# Patient Record
(demographics unavailable — no encounter records)

---

## 2025-02-18 NOTE — HISTORY OF PRESENT ILLNESS
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 He states that he has been under the pain management receiving injections He had intra-articular hip injection once And he recently had a cortisone injection 2 weeks ago in the right knee but injection did not help him  Complains of severe pain in the right knee he is walking with a single-point cane  He tried not to take any medication with his complex medical history  Patient had a post COVID infection history of PE and interstitial lung disease he had acute renal disease that is now resolved He is under the care of pulmonology and cardiology   [Pain Location] : pain [Worsening] : worsening [___ yrs] : [unfilled] year(s) ago [8] : a current pain level of 8/10 [Walking] : worsened by walking [Knee Flexion] : worsened with knee flexion [Rest] : relieved by rest

## 2025-02-18 NOTE — PHYSICAL EXAM
[Antalgic] : antalgic [Cane] : ambulates with cane [de-identified] : GENERAL APPEARANCE: Well nourished and hydrated, pleasant, alert, and oriented x 3. Appears their stated age.  HEENT: Normocephalic, extraocular eye motion intact. Nasal septum midline. Oral cavity clear. External auditory canal clear.  RESPIRATORY: Breath sounds clear and audible in all lobes. No wheezing, No accessory muscle use. CARDIOVASCULAR: No apparent abnormalities. No lower leg edema. No varicosities. Pedal pulses are palpable. NEUROLOGIC: Sensation is normal, no muscle weakness in the upper or lower extremities. DERMATOLOGIC: No apparent skin lesions, moist, warm, no rash. SPINE: Cervical spine appears normal and moves freely; thoracic spine appears normal and moves freely; lumbosacral spine appears normal and moves freely, normal, nontender. MUSCULOSKELETAL: Hands, wrists, and elbows are normal and move freely, shoulders are normal and move freely.  Musculoskeletal 5/5 motor strength in bilateral lower extremities. Sensory: Intact in bilateral lower extremities. DTRs: Biceps, brachioradialis, triceps, patellar, ankle and plantar 2+ and symmetric bilaterally. Pulses: dorsalis pedis, posterior tibial, femoral, popliteal, and radial 2+ and symmetric bilaterally.  Constitutional: Alert and in no acute distress, but well-appearing.   [de-identified] : Right hip examination shows straight leg raise without groin pain flexion of 120 degree external rotation 40 degree with hip pain internal rotation -5 degree with hip pain  Right knee examination shows varus alignment no significant joint effusion range of motion is 0 to 130 degree [de-identified] : 4 view of the right knee x-ray shows severe medial and patella compartment bone-on-bone osteoarthritis with significant bone spurs  1 view pelvic x-ray shows mild right hip osteoarthritis with significant heterotopic ossification which is a stable

## 2025-02-18 NOTE — DISCUSSION/SUMMARY
[de-identified] : Medication risks reviewed. Surgical risks reviewed. 54 y/o M with bone on bone medial compartmental osteoarthritis of the bilateral knees.  He is more symptomatic in the right knee .  He has failed to respond to conservative management with pain management at this time . we discussed the nature of the condition and the treatment options. 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 We explained again that the HO most likely formed due to him being very sedentary while in the hospital recovering from COVID.  His condition of the hip is stable . We did discuss an excision of the HO, but patient knows the outcome is variable. If he were to pursue surgical intervention, it would be with our traumatologist.  Patient was seen by Dr. Olivares but he does not want to pursue surgical treatment at this moment  he had good relief with the US guided intraarticular cortisone injections in both hips in March in 2022 He is also experiencing chronic numbness in both legs radiating down to his feet . It is difficult to determine whether or not the numbness is related to the HO formation. We discussed a possible EMG. Today we focused on his right knee care.  He has history of PE and completed Eliquis for 6-month . He does require clearance from pulmonology, cardiology, and primary to proceed with right total knee arthroplasty.  Inform patient that no injection should be given 3 months prior to his surgical date.  I provided a prescription for diclofenac to be used for severe pain as needed .  He will contact our surgical coordinator to schedule his right total knee arthroplasty   The patient is a 53 year individual with end stage arthritis of their right knee joint. Based upon the patient's continued symptoms and failure to respond to conservative treatment I have recommended a staged right total knee arthroplasty for this patient. A long discussion took place with the patient describing what a total joint replacement is and what the procedure would entail. A total knee arthroplasty model, similar to the implant that was used during the operation, was utilized to demonstrate and to discuss the various bearing surfaces of the implants. The hospitalization and post-operative care and rehabilitation were also discussed. The use of perioperative antibiotics and DVT prophylaxis were discussed. The risk, benefits and alternatives to a surgical intervention were discussed at length with the patient. The patient was also advised of risks related to the medical comorbidities, elevated body mass index (BMI), and smoking where applicable. We discussed how to reduce modifiable risk factors and encouraged smoking cessation were applicable.. A lengthy discussion took place to review the most common complications including but not limited to: deep vein thrombosis, pulmonary embolus, heart attack, stroke, infection, wound breakdown, numbness, damage to nerves, tendon, muscles, arteries or other blood vessels, death and other possible complications from anesthesia. The patient was told that we will take steps to minimize these risks by using sterile technique, antibiotics and DVT prophylaxis when appropriate and follow the patient postoperatively in the office setting to monitor progress. The possibility of recurrent pain, no improvement in pain and actual worsening of pain were also discussed with the patient.  The discharge plan of care focused on the patient going home following surgery. The patient was encouraged to make the necessary arrangements to have someone stay with them when they are discharged home. Following discharge, a home care nurse was to the patient. The home care nurse would open the patient's home care case and request home physical therapy services. Home physical therapy was to commence following discharge provided it was appropriate and covered by the health insurance benefit plan.  The benefits of surgery were discussed with the patient including the potential for improving his current clinical condition through operative intervention. Alternatives to surgical intervention including continued conservative management were also discussed in detail. All questions were answered to the satisfaction of the patient. The treatment plan of care, as well as a model of a total knee arthroplasty equivalent to the one that will be used for their total joint replacement, was shared with the patient. The patient agreed to the plan of care as well as the use of implants in their total joint replacement.

## 2025-04-09 NOTE — ASSESSMENT
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   - Recently dx with flu, was given Z-pack, then Doxycycline by PCP as well as steroids - Continue PRN oxygen - Advised to check oxygen when he feels SOB at the gym - Had a CT showing stable findings. Mild dilation of pulmonary artery 4.2cm, previously 3.9cm - This may be due to his HTN - Advised to cut down coffee (he drinks 4-5 cups of black coffee per day) as well as salt intake - Should see Dr. Peterson as f/u - Will repeat PFT at next visit - F/u in 4 mo time  The patient expressed understanding and agreement with the plan as outlined above and accepts responsibility to be compliant with any recommended testing, treatment, and follow-up visits.  All relevant questions and concerns were addressed.  30 minutes of time were spent on the encounter. Medical records were reviewed, including but not limited to hospital records, outpatient records, laboratory data, and diagnostic imaging studies. Greater than 50% of the face-to-face encounter time was spent on counseling and/or coordination of care.   Osvaldo Hooks MD, [facility] Pulmonary [facility] Critical [facility] Medicine [facility] at [place] [address] [place] [address] T: [phone] F: [phone]

## 2025-04-09 NOTE — RESULTS/DATA
[TextEntry] : AIMEE EXAM: [id number] - CT CHEST - ORDERED BY: GABO MARTINES   PROCEDURE DATE: 03/31/2025    INTERPRETATION: CLINICAL INFORMATION: EVAL  COMPARISON: Comparison made with previous examination(s) dated (DX) 12-Mar-2025,(DX) 16-Dec-2024,(CT) 05-Sep-2024,(CR) 13-Jun-2024,(NM) 13-Jun-2024,(CT) 16-Feb-2024,(CT) 03-Aug-2023,(CT) 20-Feb-2023,(CT) 19-Jan-2023,(DX) 19-Jan-2023,(DX) 16-Jan-2023,(CT) 16-Jan-2023,(CT) 01-Dec-2022,(CT) 09-Jun-2022,(CT) 17-Feb-2022,(CT) 05-Nov-2021,(CT) 22-Oct-2021,(CT) 04-Oct-2021,(CT) 13-Sep-2021,(CT) 07-Sep-2021,(CT) 11-Aug-2018.  CONTRAST/COMPLICATIONS: IV Contrast: NONE Oral Contrast: NONE .  PROCEDURE: CT chest without contrast  FINDINGS:  LUNGS AND LARGE AIRWAYS: Patent central airways. Again seen is a right apical bulla, unchanged from prior. There are areas of reticulation and fibrosis throughout both lungs with several regions of subpleural band formation. The appearance is unchanged from prior. There is a calcified pleural plaque in the anterior right hemithorax, which may be from prior pleurodesis or hemothorax. This is also unchanged from prior. PLEURA: No pleural effusion. VESSELS: Enlarged pulmonary artery. HEART: Heart size is normal. No pericardial effusion. MEDIASTINUM AND KAILASH: No lymphadenopathy. CHEST WALL AND LOWER NECK: Within normal limits. VISUALIZED UPPER ABDOMEN: Cholelithiasis. BONES: Within normal limits.  IMPRESSION:  1. Overall, stable exam. Unchanged regions of reticulation with architectural distortion and subpleural band formation throughout both lungs, consistent with fibrosis likely from prior diffuse alveolar damage.  2. Right apical bleb, unchanged from prior.  3. Enlarged pulmonary artery, which can be seen with pulmonary hypertension.  --- End of Report ---       VIRGEN KATHYA MD; Attending Radiologist This document has been electronically signed. Apr 4 2025 7:03PM  ~~~~~~~~~~~~~~~~~~~~~~~~~~~~~~~~~~~~~~~~~~~~~~~~~~~~~~~~~~~~~~~~~~~~~~~~

## 2025-04-09 NOTE — HISTORY OF PRESENT ILLNESS
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 Recently dx with flu, was given Z-pack, then Doxycycline by PCP as well as steroids. He wears O2 PRN. He is able to work-out with heavy weights, but does get winded with particularly heavy lifts. Had a CT showing stable findings. Mild dilation of pulmonary artery 4.2cm.

## 2025-05-29 NOTE — DISCUSSION/SUMMARY
[Patient] : the patient [Risks] : risks [Benefits] : benefits [Alternatives] : alternatives [With Me] : with me [___ Year(s)] : in [unfilled] year(s) [EKG obtained to assist in diagnosis and management of assessed problem(s)] : EKG obtained to assist in diagnosis and management of assessed problem(s) [FreeTextEntry1] : This is a   1)    Pre-operative cardiovascular risk evaluation and management : right knee replacemnt.  /  transthoracic echocardiogram .   if normal.  then proceed for procedure. jhas normal stress test  in the last 1 year.  DVT ppx consider full funez eliquis  as patient had prior PE.  nad willstop aspirin when he intiatie noac post knee replacement.  2) dyspnea on exertion :  improved. puloanry etiology.   2) hypertension : intiaite norvasc 2.5 mg daily.  3) pulmonary embolism : with Rv dysfunction:  mild  exercise indueced pulm HTn but  also systemic hypertension . repeat resting transthoracic echocardiogram . a nd also initaite norvasc 2,.5 mg encourage for compliance.  had ordered it in sept 2024. in the past. somehow he was not taking it.  4) recurrent infection: recurrent pneumonia.     Will order and review ECG for the above mentioned diagnosis/condition/symptoms

## 2025-05-29 NOTE — PHYSICAL EXAM
[General Appearance - Well Developed] : well developed [Normal Appearance] : normal appearance [Well Groomed] : well groomed [General Appearance - Well Nourished] : well nourished [No Deformities] : no deformities [General Appearance - In No Acute Distress] : no acute distress [Normal Conjunctiva] : the conjunctiva exhibited no abnormalities [Eyelids - No Xanthelasma] : the eyelids demonstrated no xanthelasmas [Normal Oral Mucosa] : normal oral mucosa [No Oral Pallor] : no oral pallor [No Oral Cyanosis] : no oral cyanosis [Normal Jugular Venous A Waves Present] : normal jugular venous A waves present [Normal Jugular Venous V Waves Present] : normal jugular venous V waves present [No Jugular Venous Azar A Waves] : no jugular venous azar A waves [Respiration, Rhythm And Depth] : normal respiratory rhythm and effort [Exaggerated Use Of Accessory Muscles For Inspiration] : no accessory muscle use [Auscultation Breath Sounds / Voice Sounds] : lungs were clear to auscultation bilaterally [Heart Rate And Rhythm] : heart rate and rhythm were normal [Heart Sounds] : normal S1 and S2 [Murmurs] : no murmurs present [Abdomen Soft] : soft [Abdomen Tenderness] : non-tender [Abdomen Mass (___ Cm)] : no abdominal mass palpated [Abnormal Walk] : normal gait [Gait - Sufficient For Exercise Testing] : the gait was sufficient for exercise testing [Cyanosis, Localized] : no localized cyanosis [Nail Clubbing] : no clubbing of the fingernails [Petechial Hemorrhages (___cm)] : no petechial hemorrhages [Skin Color & Pigmentation] : normal skin color and pigmentation [] : no rash [No Venous Stasis] : no venous stasis [Skin Lesions] : no skin lesions [No Skin Ulcers] : no skin ulcer [No Xanthoma] : no  xanthoma was observed [Oriented To Time, Place, And Person] : oriented to person, place, and time [Affect] : the affect was normal [Mood] : the mood was normal [No Anxiety] : not feeling anxious

## 2025-05-29 NOTE — REASON FOR VISIT
[Symptom and Test Evaluation] : symptom and test evaluation [FreeTextEntry1] : Pre-operative cardiovascular risk evaluation and management

## 2025-05-29 NOTE — HISTORY OF PRESENT ILLNESS
[FreeTextEntry1] : Pre-operative cardiovascular risk evaluation and management  for knee arthrscopy and irregular ECG    HPI for today :  5 29 2025:  feels good. he does have knee pain abilaterlal.  he is going for a knee surgery in oct 2025, right knee replacement.  right for surer no chest pain . no dyspnea on exertion   old NOTE: :  he is still feelign dyspnea on exertion . when he lays down he feels dyspnea on exertion .  his lung disease has improved .  he had covid   he does have reactive airway disaese now. when he walks fast he can feel dyspnea on exertion   ol n note:    feels good. some salty food . leg edema intermittent when he eats salty food.   and he is feeling fatigue and tired.     50 year old male with history of lumbar fusion, severe COVID infection 9/2021 which resulted in PNA, intubation and subsequent tracheostomy, PE with RV dysfunction, and heterotropic ossification of hips, who presents to the office for follow up of RV dysfunction.    old note:  he was tested positive on sept 2n .  and on 7th he was positive.  he had coivd pneumonia.  his pneumonia got worse.  on sept 9. his oxygen was in 70's/ he got remdesivir.  and then 10 days o. he got intubated on sept 20.  he was on vent. he was on vent for 15 days.  he got tracheotomy he was on ./hd for 3 days. he had PE and bacterial pneumonia. he was on heparin. some blood .  oct 6, he got trachetomy.  he felt better.  he had some feeding tube  he went to [facility] in [facility].  in nov was sent to rehab. .   before he left rehab. they had notived some fluid   he was put on abx again.  he had low grade fever.  and now since 3 days he is off abx.      old note: This is a 49 yea rold male with obesity, here with irregular ECg and here for Pre-operative cardiovascular risk evaluation and management for right knee arthorscopy.  he takes anabolic steroids and testosterone.  father: MI: at age 70.

## 2025-05-29 NOTE — CARDIOLOGY SUMMARY
[___] : [unfilled] [de-identified] : 5 29 2025: Sinus Rhythm -First degree A-V block  Bernice = 240  Poor r wave progressions.  ABNORMAL 5 7 2024:   sinus rhyhtm  6 21 2022 :    Sinus  Rhythm  WITHIN NORMAL LIMITS   1 18 2022   Sinus  Rhythm  -Left axis -anterior fascicular block.   ABNORMAL   [de-identified] : jan 2024:  normal LVEf. normal RV . no significant valvular abnormality  [de-identified] : cardiac cta:  CAC  = 0. normal coronary arteries.  dialted pulm Artery.  [de-identified] : US arterial Dupelx leg: normal  ABRAHAN was [place]

## 2025-06-03 NOTE — HISTORY OF PRESENT ILLNESS
[de-identified] : This is a 53-year-old male with severe medial and patellofemoral compartment osteoarthritis of the right knee he is scheduled to have right knee replacement in October Today he complains of increased pain in the left knee, not as bad as the right side   but he inquires about having both knee replaced  Patient is under pain management receiving injections gives him knee short-term relief he is walking with single-point cane  Does have a medical history of PE after COVID infection he is under the care of of pulmonary and cardiologist Cardiology is recommending using Eliquis after surgery is on 325 mg aspirin daily Patient has a prescription of  diclofenac that he tries not to take it  He also has has mild to moderate intermittent hip pain  [Pain Location] : pain [Worsening] : worsening [6] : a current pain level of 6/10 [Walking] : worsened by walking [Knee Flexion] : worsened with knee flexion [Rest] : relieved by rest

## 2025-06-03 NOTE — PHYSICAL EXAM
[Antalgic] : antalgic [Cane] : ambulates with cane [de-identified] : GENERAL APPEARANCE: Well nourished and hydrated, pleasant, alert, and oriented x 3. Appears their stated age.  HEENT: Normocephalic, extraocular eye motion intact. Nasal septum midline. Oral cavity clear. External auditory canal clear.  RESPIRATORY: Breath sounds clear and audible in all lobes. No wheezing, No accessory muscle use. CARDIOVASCULAR: No apparent abnormalities. No lower leg edema. No varicosities. Pedal pulses are palpable. NEUROLOGIC: Sensation is normal, no muscle weakness in the upper or lower extremities. DERMATOLOGIC: No apparent skin lesions, moist, warm, no rash. SPINE: Cervical spine appears normal and moves freely; thoracic spine appears normal and moves freely; lumbosacral spine appears normal and moves freely, normal, nontender. MUSCULOSKELETAL: Hands, wrists, and elbows are normal and move freely, shoulders are normal and move freely.  Musculoskeletal 5/5 motor strength in bilateral lower extremities. Sensory: Intact in bilateral lower extremities. DTRs: Biceps, brachioradialis, triceps, patellar, ankle and plantar 2+ and symmetric bilaterally. Pulses: dorsalis pedis, posterior tibial, femoral, popliteal, and radial 2+ and symmetric bilaterally.  Constitutional: Alert and in no acute distress, but well-appearing.   [de-identified] : Bilateral knee examination shows varus deformity right knee has a moderate joint effusion medial joint line tenderness range of motion is 5 to 120 degrees  [de-identified] : 4 view left knee x-ray demonstrates severe medial compartment osteoarthritis with significant bone spurs and osteophyte formation

## 2025-06-03 NOTE — DISCUSSION/SUMMARY
[de-identified] : Medication risks reviewed. Surgical risks reviewed. 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  He is more symptomatic in the right knee .  He has failed to respond to conservative management with pain management at this time . we discussed the nature of the condition and the treatment options. 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 We explained again that the HO most likely formed due to him being very sedentary while in the hospital recovering from COVID.  His condition of the hip is stable . We did discuss an excision of the HO, but patient knows the outcome is variable. If he were to pursue surgical intervention, it would be with our traumatologist.  Patient was seen by Dr. Olivares but he does not want to pursue surgical treatment at this moment  he had good relief with the US guided intraarticular cortisone injections in both hips in March in 2022 He is also experiencing chronic numbness in both legs radiating down to his feet . It is difficult to determine whether or not the numbness is related to the HO formation. We discussed a possible EMG.  But since that his pain has been chronic intermittent and stable we will focus on his knee pain Patient is scheduled to have a right total knee arthroplasty in October.  He had increased pain in the left knee and he inquired about bilateral knee replacement at the same time. Based on his history of PE  ( completed Eliquis for 6-month ) , interstitial lung disease moderate asthma.  His risk for blood clot is elevated. I recommend pursuing staged bilateral knee replacement.  Patient agrees.  he does require clearance from pulmonology, cardiology, and primary to proceed with right total knee arthroplasty.  Inform patient that no injection should be given 3 months prior to his surgical date.  Patient is planning to have bilateral knee injection by pain management patient is aware not to have injections within 90 days from the surgery .  His cardiology recommended him to use Eliquis for DVT prophylaxis after surgery .   I spent 30 minutes review of medical history, assessing current issue, conduct  physical exam , discuss the diagnosis,   review of diagnostic studies , couseling regarding pathophysiology of its condition  and its treatment plan  with the patient.  The patient is a 53 year individual with end stage arthritis of their right knee joint. Based upon the patient's continued symptoms and failure to respond to conservative treatment I have recommended a staged right total knee arthroplasty for this patient. A long discussion took place with the patient describing what a total joint replacement is and what the procedure would entail. A total knee arthroplasty model, similar to the implant that was used during the operation, was utilized to demonstrate and to discuss the various bearing surfaces of the implants. The hospitalization and post-operative care and rehabilitation were also discussed. The use of perioperative antibiotics and DVT prophylaxis were discussed. The risk, benefits and alternatives to a surgical intervention were discussed at length with the patient. The patient was also advised of risks related to the medical comorbidities, elevated body mass index (BMI), and smoking where applicable. We discussed how to reduce modifiable risk factors and encouraged smoking cessation were applicable.. A lengthy discussion took place to review the most common complications including but not limited to: deep vein thrombosis, pulmonary embolus, heart attack, stroke, infection, wound breakdown, numbness, damage to nerves, tendon, muscles, arteries or other blood vessels, death and other possible complications from anesthesia. The patient was told that we will take steps to minimize these risks by using sterile technique, antibiotics and DVT prophylaxis when appropriate and follow the patient postoperatively in the office setting to monitor progress. The possibility of recurrent pain, no improvement in pain and actual worsening of pain were also discussed with the patient.  The discharge plan of care focused on the patient going home following surgery. The patient was encouraged to make the necessary arrangements to have someone stay with them when they are discharged home. Following discharge, a home care nurse was to the patient. The home care nurse would open the patient's home care case and request home physical therapy services. Home physical therapy was to commence following discharge provided it was appropriate and covered by the health insurance benefit plan.  The benefits of surgery were discussed with the patient including the potential for improving his current clinical condition through operative intervention. Alternatives to surgical intervention including continued conservative management were also discussed in detail. All questions were answered to the satisfaction of the patient. The treatment plan of care, as well as a model of a total knee arthroplasty equivalent to the one that will be used for their total joint replacement, was shared with the patient. The patient agreed to the plan of care as well as the use of implants in their total joint replacement.